# Patient Record
Sex: MALE | Race: WHITE | Employment: FULL TIME | URBAN - METROPOLITAN AREA
[De-identification: names, ages, dates, MRNs, and addresses within clinical notes are randomized per-mention and may not be internally consistent; named-entity substitution may affect disease eponyms.]

---

## 2024-11-14 ENCOUNTER — HOSPITAL ENCOUNTER (INPATIENT)
Age: 34
LOS: 1 days | Discharge: HOME | End: 2024-11-15
Attending: FAMILY MEDICINE | Admitting: INTERNAL MEDICINE
Payer: COMMERCIAL

## 2024-11-14 ENCOUNTER — APPOINTMENT (OUTPATIENT)
Dept: CT IMAGING | Age: 34
End: 2024-11-14
Payer: COMMERCIAL

## 2024-11-14 ENCOUNTER — HOSPITAL ENCOUNTER (EMERGENCY)
Age: 34
Discharge: ANOTHER HOSPITAL | End: 2024-11-14
Attending: EMERGENCY MEDICINE
Payer: COMMERCIAL

## 2024-11-14 VITALS
HEART RATE: 76 BPM | WEIGHT: 170 LBS | RESPIRATION RATE: 11 BRPM | OXYGEN SATURATION: 98 % | BODY MASS INDEX: 20.7 KG/M2 | DIASTOLIC BLOOD PRESSURE: 94 MMHG | HEIGHT: 76 IN | SYSTOLIC BLOOD PRESSURE: 130 MMHG

## 2024-11-14 DIAGNOSIS — I77.74 VERTEBRAL ARTERY DISSECTION (HCC): Primary | ICD-10-CM

## 2024-11-14 DIAGNOSIS — R20.2 FACIAL PARESTHESIA: ICD-10-CM

## 2024-11-14 DIAGNOSIS — R29.90 STROKE-LIKE SYMPTOMS: ICD-10-CM

## 2024-11-14 LAB
ALBUMIN SERPL-MCNC: 5 G/DL (ref 3.4–5)
ALBUMIN/GLOB SERPL: 1.8 {RATIO} (ref 1.1–2.2)
ALP SERPL-CCNC: 92 U/L (ref 40–129)
ALT SERPL-CCNC: 20 U/L (ref 10–40)
ANION GAP SERPL CALCULATED.3IONS-SCNC: 9 MMOL/L (ref 3–16)
AST SERPL-CCNC: 21 U/L (ref 15–37)
BASOPHILS # BLD: 0.1 K/UL (ref 0–0.2)
BASOPHILS NFR BLD: 0.8 %
BILIRUB SERPL-MCNC: 2.1 MG/DL (ref 0–1)
BUN SERPL-MCNC: 20 MG/DL (ref 7–20)
CALCIUM SERPL-MCNC: 10.4 MG/DL (ref 8.3–10.6)
CHLORIDE SERPL-SCNC: 99 MMOL/L (ref 99–110)
CO2 SERPL-SCNC: 31 MMOL/L (ref 21–32)
CREAT SERPL-MCNC: 1 MG/DL (ref 0.9–1.3)
DEPRECATED RDW RBC AUTO: 13.1 % (ref 12.4–15.4)
EKG ATRIAL RATE: 73 BPM
EKG DIAGNOSIS: NORMAL
EKG P AXIS: 68 DEGREES
EKG P-R INTERVAL: 152 MS
EKG Q-T INTERVAL: 372 MS
EKG QRS DURATION: 90 MS
EKG QTC CALCULATION (BAZETT): 409 MS
EKG R AXIS: -41 DEGREES
EKG T AXIS: 69 DEGREES
EKG VENTRICULAR RATE: 73 BPM
EOSINOPHIL # BLD: 0 K/UL (ref 0–0.6)
EOSINOPHIL NFR BLD: 0.3 %
GFR SERPLBLD CREATININE-BSD FMLA CKD-EPI: >90 ML/MIN/{1.73_M2}
GLUCOSE BLD-MCNC: 113 MG/DL (ref 70–99)
GLUCOSE SERPL-MCNC: 108 MG/DL (ref 70–99)
HCT VFR BLD AUTO: 48.1 % (ref 40.5–52.5)
HGB BLD-MCNC: 16.6 G/DL (ref 13.5–17.5)
INR PPP: 1.11 (ref 0.85–1.15)
LYMPHOCYTES # BLD: 1.5 K/UL (ref 1–5.1)
LYMPHOCYTES NFR BLD: 24 %
MAGNESIUM SERPL-MCNC: 2.2 MG/DL (ref 1.8–2.4)
MCH RBC QN AUTO: 30.2 PG (ref 26–34)
MCHC RBC AUTO-ENTMCNC: 34.5 G/DL (ref 31–36)
MCV RBC AUTO: 87.4 FL (ref 80–100)
MONOCYTES # BLD: 0.7 K/UL (ref 0–1.3)
MONOCYTES NFR BLD: 11 %
NEUTROPHILS # BLD: 4.1 K/UL (ref 1.7–7.7)
NEUTROPHILS NFR BLD: 63.9 %
PERFORMED ON: ABNORMAL
PLATELET # BLD AUTO: 235 K/UL (ref 135–450)
PMV BLD AUTO: 7.5 FL (ref 5–10.5)
POTASSIUM SERPL-SCNC: 4.3 MMOL/L (ref 3.5–5.1)
PROT SERPL-MCNC: 7.8 G/DL (ref 6.4–8.2)
PROTHROMBIN TIME: 14.5 SEC (ref 11.9–14.9)
RBC # BLD AUTO: 5.51 M/UL (ref 4.2–5.9)
SODIUM SERPL-SCNC: 139 MMOL/L (ref 136–145)
TROPONIN, HIGH SENSITIVITY: 8 NG/L (ref 0–22)
WBC # BLD AUTO: 6.4 K/UL (ref 4–11)

## 2024-11-14 PROCEDURE — 96374 THER/PROPH/DIAG INJ IV PUSH: CPT

## 2024-11-14 PROCEDURE — 99285 EMERGENCY DEPT VISIT HI MDM: CPT

## 2024-11-14 PROCEDURE — 6370000000 HC RX 637 (ALT 250 FOR IP)

## 2024-11-14 PROCEDURE — 36415 COLL VENOUS BLD VENIPUNCTURE: CPT

## 2024-11-14 PROCEDURE — 6370000000 HC RX 637 (ALT 250 FOR IP): Performed by: EMERGENCY MEDICINE

## 2024-11-14 PROCEDURE — 70450 CT HEAD/BRAIN W/O DYE: CPT

## 2024-11-14 PROCEDURE — 70498 CT ANGIOGRAPHY NECK: CPT

## 2024-11-14 PROCEDURE — 85025 COMPLETE CBC W/AUTO DIFF WBC: CPT

## 2024-11-14 PROCEDURE — 83735 ASSAY OF MAGNESIUM: CPT

## 2024-11-14 PROCEDURE — 84484 ASSAY OF TROPONIN QUANT: CPT

## 2024-11-14 PROCEDURE — 83036 HEMOGLOBIN GLYCOSYLATED A1C: CPT

## 2024-11-14 PROCEDURE — 85610 PROTHROMBIN TIME: CPT

## 2024-11-14 PROCEDURE — 6360000002 HC RX W HCPCS: Performed by: EMERGENCY MEDICINE

## 2024-11-14 PROCEDURE — 6360000004 HC RX CONTRAST MEDICATION: Performed by: EMERGENCY MEDICINE

## 2024-11-14 PROCEDURE — 80053 COMPREHEN METABOLIC PANEL: CPT

## 2024-11-14 PROCEDURE — 80061 LIPID PANEL: CPT

## 2024-11-14 PROCEDURE — 2060000000 HC ICU INTERMEDIATE R&B

## 2024-11-14 RX ORDER — ATORVASTATIN CALCIUM 80 MG/1
80 TABLET, FILM COATED ORAL NIGHTLY
Status: DISCONTINUED | OUTPATIENT
Start: 2024-11-14 | End: 2024-11-15 | Stop reason: HOSPADM

## 2024-11-14 RX ORDER — POTASSIUM CHLORIDE 7.45 MG/ML
10 INJECTION INTRAVENOUS PRN
Status: CANCELLED | OUTPATIENT
Start: 2024-11-14

## 2024-11-14 RX ORDER — SODIUM CHLORIDE 0.9 % (FLUSH) 0.9 %
5-40 SYRINGE (ML) INJECTION EVERY 12 HOURS SCHEDULED
Status: DISCONTINUED | OUTPATIENT
Start: 2024-11-14 | End: 2024-11-15 | Stop reason: HOSPADM

## 2024-11-14 RX ORDER — SODIUM CHLORIDE 9 MG/ML
INJECTION, SOLUTION INTRAVENOUS PRN
Status: DISCONTINUED | OUTPATIENT
Start: 2024-11-14 | End: 2024-11-15 | Stop reason: HOSPADM

## 2024-11-14 RX ORDER — POTASSIUM CHLORIDE 1500 MG/1
40 TABLET, EXTENDED RELEASE ORAL PRN
Status: CANCELLED | OUTPATIENT
Start: 2024-11-14

## 2024-11-14 RX ORDER — ACETAMINOPHEN 325 MG/1
650 TABLET ORAL EVERY 6 HOURS PRN
Status: DISCONTINUED | OUTPATIENT
Start: 2024-11-14 | End: 2024-11-15 | Stop reason: HOSPADM

## 2024-11-14 RX ORDER — ACETAMINOPHEN 650 MG/1
650 SUPPOSITORY RECTAL EVERY 6 HOURS PRN
Status: DISCONTINUED | OUTPATIENT
Start: 2024-11-14 | End: 2024-11-15 | Stop reason: HOSPADM

## 2024-11-14 RX ORDER — IOPAMIDOL 755 MG/ML
75 INJECTION, SOLUTION INTRAVASCULAR
Status: COMPLETED | OUTPATIENT
Start: 2024-11-14 | End: 2024-11-14

## 2024-11-14 RX ORDER — HYDRALAZINE HYDROCHLORIDE 20 MG/ML
5 INJECTION INTRAMUSCULAR; INTRAVENOUS ONCE
Status: COMPLETED | OUTPATIENT
Start: 2024-11-14 | End: 2024-11-14

## 2024-11-14 RX ORDER — ONDANSETRON 4 MG/1
4 TABLET, ORALLY DISINTEGRATING ORAL EVERY 8 HOURS PRN
Status: CANCELLED | OUTPATIENT
Start: 2024-11-14

## 2024-11-14 RX ORDER — MAGNESIUM SULFATE IN WATER 40 MG/ML
2000 INJECTION, SOLUTION INTRAVENOUS PRN
Status: CANCELLED | OUTPATIENT
Start: 2024-11-14

## 2024-11-14 RX ORDER — SODIUM CHLORIDE 0.9 % (FLUSH) 0.9 %
5-40 SYRINGE (ML) INJECTION PRN
Status: DISCONTINUED | OUTPATIENT
Start: 2024-11-14 | End: 2024-11-15 | Stop reason: HOSPADM

## 2024-11-14 RX ORDER — ENOXAPARIN SODIUM 100 MG/ML
40 INJECTION SUBCUTANEOUS DAILY
Status: CANCELLED | OUTPATIENT
Start: 2024-11-15

## 2024-11-14 RX ORDER — ASPIRIN 81 MG/1
324 TABLET, CHEWABLE ORAL ONCE
Status: COMPLETED | OUTPATIENT
Start: 2024-11-14 | End: 2024-11-14

## 2024-11-14 RX ORDER — ASPIRIN 81 MG/1
81 TABLET, CHEWABLE ORAL DAILY
Status: DISCONTINUED | OUTPATIENT
Start: 2024-11-15 | End: 2024-11-15 | Stop reason: HOSPADM

## 2024-11-14 RX ORDER — POLYETHYLENE GLYCOL 3350 17 G/17G
17 POWDER, FOR SOLUTION ORAL DAILY PRN
Status: DISCONTINUED | OUTPATIENT
Start: 2024-11-14 | End: 2024-11-15 | Stop reason: HOSPADM

## 2024-11-14 RX ORDER — ONDANSETRON 2 MG/ML
4 INJECTION INTRAMUSCULAR; INTRAVENOUS EVERY 6 HOURS PRN
Status: CANCELLED | OUTPATIENT
Start: 2024-11-14

## 2024-11-14 RX ADMIN — ASPIRIN 324 MG: 81 TABLET, CHEWABLE ORAL at 16:31

## 2024-11-14 RX ADMIN — HYDRALAZINE HYDROCHLORIDE 5 MG: 20 INJECTION INTRAMUSCULAR; INTRAVENOUS at 16:31

## 2024-11-14 RX ADMIN — ATORVASTATIN CALCIUM 80 MG: 80 TABLET, FILM COATED ORAL at 23:37

## 2024-11-14 RX ADMIN — IOPAMIDOL 75 ML: 755 INJECTION, SOLUTION INTRAVENOUS at 14:32

## 2024-11-14 ASSESSMENT — PAIN SCALES - GENERAL: PAINLEVEL_OUTOF10: 2

## 2024-11-14 ASSESSMENT — PAIN - FUNCTIONAL ASSESSMENT: PAIN_FUNCTIONAL_ASSESSMENT: 0-10

## 2024-11-14 NOTE — ED PROVIDER NOTES
EMERGENCY DEPARTMENT PROVIDER NOTE    Patient Identification  Pt Name: Dameon Kessler  MRN: [id number]  Birthdate 1990  Date of evaluation: 11/14/2024  Provider: Giovanny Aguilar DO  PCP: No primary care provider on file.    Chief Complaint  Numbness (Left face numbness began at 10:45 am, tunnel vision and he reports he could not say what he was thinking/processing information)      HPI  (History provided by patient and spouse)  This is a 34 y.o. male who was brought in by self for left-sided facial numbness.  Patient states he first began feeling \"off\" around 0830 this morning, while at work around 1045 today he developed left-sided facial tingling and felt as though he was losing peripheral vision in both eyes.  His colleagues at work noticed he was having difficulty with his speech as well, patient felt he could not form words correctly.  He has had a mild headache today, however has had headaches off and on for months which have been similar.  Also reports mild neck pain ongoing for the past week.  He has not had any trauma or injury.  He denies any weakness or numbness of the extremities.  No difficulties with ambulation.     Patient states he was told he has hypertension in the past, however does not currently take any medications for it.  Denies any history of high cholesterol, diabetes or tobacco use.  No alcohol or recreational drug use.      I have reviewed the following nursing documentation:  Allergies: Patient has no known allergies.    Past medical history: No past medical history on file.  Past surgical history: No past surgical history on file.    Home medications:   Previous Medications    No medications on file       Social history:      Family history:  No family history on file.      Exam  ED Triage Vitals [11/14/24 1416]   BP Systolic BP Percentile Diastolic BP Percentile Temp Temp src Pulse Respirations SpO2   (!) 168/109 -- -- -- -- 77 16 100 %      Height Weight - Scale         1.93 m (6'

## 2024-11-14 NOTE — ED NOTES
Pt requesting wife to transport him to [facility]. Advised Dr. ALICIA Aguilar, Per Dr. Aguilar. It is recommended pt to be transported via private ambulance. But pt is allowed to refuse after education of risk.     Spoke with patient and wife. Pt understands the risks of driving via private vehicle. Pt wishes to be transported by wife.    PIV wrapped with coban. Advised pt to drive directly to [facility] and remain NPO other than ice chips/water.     Pt provided with patient chart and EMTALA.

## 2024-11-14 NOTE — PLAN OF CARE
GITWAN Attending acceptance note    Per report of ED Physician; Not a management plan which would be formulated when patient is independently evaluated on arrival at the [facility]      34 y.o. male hx of HTN, not on meds, who presents to the ED after having an episode today of mild dysarthria, bilateral peripheral vision loss, and facial tingling.     Appears deficits almost completely resolved while in the ED, hence not felt to be a candidate for TNK     HIs CT head was neg.     CTA was noted to have concern for R vertebral artery dissection w/out thrombus.       Was given aspirin (325 mg) per  stroke team and Neurosurgery. No plavix no heparin, No apparent LVO, so does not require endovascular therapy. No need for ICU per NSGY and  stroke Team. Transferring provider agrees.    To update us with any neuro changes    /109 on presentation      Empiric diagnosis    TIA possible acute CVA    R vertebral artery dissection    Hypertension, uncontrolled        Accepted to PCU level of care based on recommendation.   stroke team and neurosurgery service to home the ED doctor has discussed patient with.    Neuro-checks    Monitor BP    We will have a low threshold to transfer to ICU    Consult neurology on arrival please.    Management plan to be formulated after pxt is seen. Please see H and P to follow  Once patient arrive please page ON CALL HOSPITALIST so patient can be seen.   If unable to reach physician on [facility] please call hospitalist phone (#[phone])       Sae Lehman MD

## 2024-11-14 NOTE — CONSULTS
Stroke Team Phone Consultation:     I was contacted regarding Mr. Kessler. Briefly, Dameon Kessler is a 34 y.o. male who presents to the ED after having an episode today of mild dysarthria, bilateral peripheral vision loss, and facial tingling. Per discussion w/ Dr. Aguilar, the deficits have near completely resolved, and are currently non disabling. Thus, he is not a candidate for TNK. HIs CT head was neg. However, CTA was noted to have concern for R vertebral artery dissection w/out thrombus. I have recommended aspirin (325 mg), and admission for further w/up and monitoring. At the moment, the patient does not have an LVO, so does not require endovascular therapy. Please contact  Stroke Team with additional questions / concerns.     Marilyn Soria MD MS   Stroke Team

## 2024-11-15 ENCOUNTER — HOSPITAL ENCOUNTER (INPATIENT)
Age: 34
Discharge: HOME | End: 2024-11-17
Attending: FAMILY MEDICINE
Payer: COMMERCIAL

## 2024-11-15 ENCOUNTER — APPOINTMENT (OUTPATIENT)
Dept: MRI IMAGING | Age: 34
End: 2024-11-15
Attending: FAMILY MEDICINE
Payer: COMMERCIAL

## 2024-11-15 VITALS
WEIGHT: 171 LBS | DIASTOLIC BLOOD PRESSURE: 93 MMHG | SYSTOLIC BLOOD PRESSURE: 142 MMHG | BODY MASS INDEX: 20.82 KG/M2 | HEIGHT: 76 IN

## 2024-11-15 VITALS
WEIGHT: 171.96 LBS | DIASTOLIC BLOOD PRESSURE: 93 MMHG | TEMPERATURE: 97.4 F | HEIGHT: 76 IN | SYSTOLIC BLOOD PRESSURE: 131 MMHG | BODY MASS INDEX: 20.94 KG/M2 | RESPIRATION RATE: 14 BRPM | HEART RATE: 76 BPM | OXYGEN SATURATION: 98 %

## 2024-11-15 LAB
ANION GAP SERPL CALCULATED.3IONS-SCNC: 10 MMOL/L (ref 3–16)
BASOPHILS # BLD: 0 K/UL (ref 0–0.2)
BASOPHILS NFR BLD: 0.7 %
BUN SERPL-MCNC: 16 MG/DL (ref 7–20)
CALCIUM SERPL-MCNC: 10 MG/DL (ref 8.3–10.6)
CHLORIDE SERPL-SCNC: 104 MMOL/L (ref 99–110)
CHOLEST SERPL-MCNC: 165 MG/DL (ref 0–199)
CO2 SERPL-SCNC: 26 MMOL/L (ref 21–32)
CREAT SERPL-MCNC: 1 MG/DL (ref 0.9–1.3)
DEPRECATED RDW RBC AUTO: 13 % (ref 12.4–15.4)
ECHO AO ROOT DIAM: 3.2 CM
ECHO AO ROOT INDEX: 1.55 CM/M2
ECHO AV AREA PEAK VELOCITY: 4.1 CM2
ECHO AV AREA/BSA PEAK VELOCITY: 2 CM2/M2
ECHO AV PEAK GRADIENT: 3 MMHG
ECHO AV PEAK VELOCITY: 0.8 M/S
ECHO AV VELOCITY RATIO: 1
ECHO BSA: 2.04 M2
ECHO IVC INSP: 0.2 CM
ECHO IVC PROX: 1.1 CM
ECHO LV E' LATERAL VELOCITY: 12.3 CM/S
ECHO LV E' SEPTAL VELOCITY: 9.55 CM/S
ECHO LV EDV A2C: 110 ML
ECHO LV EDV A4C: 118 ML
ECHO LV EDV INDEX A4C: 57 ML/M2
ECHO LV EDV NDEX A2C: 53 ML/M2
ECHO LV EJECTION FRACTION A2C: 58 %
ECHO LV EJECTION FRACTION A4C: 58 %
ECHO LV EJECTION FRACTION BIPLANE: 58 % (ref 55–100)
ECHO LV ESV A2C: 46 ML
ECHO LV ESV A4C: 49 ML
ECHO LV ESV INDEX A2C: 22 ML/M2
ECHO LV ESV INDEX A4C: 24 ML/M2
ECHO LV FRACTIONAL SHORTENING: 29 % (ref 28–44)
ECHO LV INTERNAL DIMENSION DIASTOLE INDEX: 1.98 CM/M2
ECHO LV INTERNAL DIMENSION DIASTOLIC: 4.1 CM (ref 4.2–5.9)
ECHO LV INTERNAL DIMENSION SYSTOLIC INDEX: 1.4 CM/M2
ECHO LV INTERNAL DIMENSION SYSTOLIC: 2.9 CM
ECHO LV IVSD: 0.9 CM (ref 0.6–1)
ECHO LV MASS 2D: 97.3 G (ref 88–224)
ECHO LV MASS INDEX 2D: 47 G/M2 (ref 49–115)
ECHO LV POSTERIOR WALL DIASTOLIC: 0.7 CM (ref 0.6–1)
ECHO LV RELATIVE WALL THICKNESS RATIO: 0.34
ECHO LVOT AREA: 4.5 CM2
ECHO LVOT DIAM: 2.4 CM
ECHO LVOT MEAN GRADIENT: 1 MMHG
ECHO LVOT PEAK GRADIENT: 2 MMHG
ECHO LVOT PEAK VELOCITY: 0.8 M/S
ECHO LVOT STROKE VOLUME INDEX: 31 ML/M2
ECHO LVOT SV: 64.2 ML
ECHO LVOT VTI: 14.2 CM
ECHO MV A VELOCITY: 0.75 M/S
ECHO MV E DECELERATION TIME (DT): 201 MS
ECHO MV E VELOCITY: 0.84 M/S
ECHO MV E/A RATIO: 1.12
ECHO MV E/E' LATERAL: 6.83
ECHO MV E/E' RATIO (AVERAGED): 7.81
ECHO MV E/E' SEPTAL: 8.8
ECHO PV MAX VELOCITY: 0.9 M/S
ECHO PV PEAK GRADIENT: 3 MMHG
ECHO RV BASAL DIMENSION: 3.9 CM
ECHO RV LONGITUDINAL DIMENSION: 7.5 CM
ECHO RV MID DIMENSION: 2 CM
ECHO RV TAPSE: 1.8 CM (ref 1.7–?)
EOSINOPHIL # BLD: 0 K/UL (ref 0–0.6)
EOSINOPHIL NFR BLD: 0.4 %
EST. AVERAGE GLUCOSE BLD GHB EST-MCNC: 96.8 MG/DL
GFR SERPLBLD CREATININE-BSD FMLA CKD-EPI: >90 ML/MIN/{1.73_M2}
GLUCOSE SERPL-MCNC: 82 MG/DL (ref 70–99)
HBA1C MFR BLD: 5 %
HCT VFR BLD AUTO: 47.6 % (ref 40.5–52.5)
HDLC SERPL-MCNC: 57 MG/DL (ref 40–60)
HGB BLD-MCNC: 16.5 G/DL (ref 13.5–17.5)
LACTATE BLDV-SCNC: 0.8 MMOL/L (ref 0.4–2)
LDLC SERPL CALC-MCNC: 97 MG/DL
LYMPHOCYTES # BLD: 1.4 K/UL (ref 1–5.1)
LYMPHOCYTES NFR BLD: 24.2 %
MAGNESIUM SERPL-MCNC: 2.18 MG/DL (ref 1.8–2.4)
MCH RBC QN AUTO: 31 PG (ref 26–34)
MCHC RBC AUTO-ENTMCNC: 34.7 G/DL (ref 31–36)
MCV RBC AUTO: 89.3 FL (ref 80–100)
MONOCYTES # BLD: 0.7 K/UL (ref 0–1.3)
MONOCYTES NFR BLD: 12.3 %
NEUTROPHILS # BLD: 3.7 K/UL (ref 1.7–7.7)
NEUTROPHILS NFR BLD: 62.4 %
PLATELET # BLD AUTO: 238 K/UL (ref 135–450)
PMV BLD AUTO: 8 FL (ref 5–10.5)
POTASSIUM SERPL-SCNC: 4.8 MMOL/L (ref 3.5–5.1)
RBC # BLD AUTO: 5.33 M/UL (ref 4.2–5.9)
SODIUM SERPL-SCNC: 140 MMOL/L (ref 136–145)
TRIGL SERPL-MCNC: 56 MG/DL (ref 0–150)
VLDLC SERPL CALC-MCNC: 11 MG/DL
WBC # BLD AUTO: 5.9 K/UL (ref 4–11)

## 2024-11-15 PROCEDURE — 83735 ASSAY OF MAGNESIUM: CPT

## 2024-11-15 PROCEDURE — 36415 COLL VENOUS BLD VENIPUNCTURE: CPT

## 2024-11-15 PROCEDURE — 93306 TTE W/DOPPLER COMPLETE: CPT | Performed by: INTERNAL MEDICINE

## 2024-11-15 PROCEDURE — 2580000003 HC RX 258

## 2024-11-15 PROCEDURE — 83605 ASSAY OF LACTIC ACID: CPT

## 2024-11-15 PROCEDURE — 85025 COMPLETE CBC W/AUTO DIFF WBC: CPT

## 2024-11-15 PROCEDURE — 80048 BASIC METABOLIC PNL TOTAL CA: CPT

## 2024-11-15 PROCEDURE — 93306 TTE W/DOPPLER COMPLETE: CPT

## 2024-11-15 PROCEDURE — 6370000000 HC RX 637 (ALT 250 FOR IP)

## 2024-11-15 PROCEDURE — APPNB180 APP NON BILLABLE TIME > 60 MINS

## 2024-11-15 PROCEDURE — 6370000000 HC RX 637 (ALT 250 FOR IP): Performed by: PSYCHIATRY & NEUROLOGY

## 2024-11-15 PROCEDURE — 99223 1ST HOSP IP/OBS HIGH 75: CPT | Performed by: PSYCHIATRY & NEUROLOGY

## 2024-11-15 PROCEDURE — 70551 MRI BRAIN STEM W/O DYE: CPT

## 2024-11-15 RX ORDER — ATORVASTATIN CALCIUM 80 MG/1
80 TABLET, FILM COATED ORAL NIGHTLY
Qty: 30 TABLET | Refills: 3 | Status: SHIPPED | OUTPATIENT
Start: 2024-11-15

## 2024-11-15 RX ORDER — ASPIRIN 81 MG/1
81 TABLET, CHEWABLE ORAL DAILY
Qty: 30 TABLET | Refills: 3 | Status: SHIPPED | OUTPATIENT
Start: 2024-11-16

## 2024-11-15 RX ORDER — CLOPIDOGREL BISULFATE 75 MG/1
75 TABLET ORAL DAILY
Qty: 21 TABLET | Refills: 0 | Status: SHIPPED | OUTPATIENT
Start: 2024-11-16 | End: 2024-12-07

## 2024-11-15 RX ORDER — CLOPIDOGREL BISULFATE 75 MG/1
75 TABLET ORAL DAILY
Status: DISCONTINUED | OUTPATIENT
Start: 2024-11-15 | End: 2024-11-15 | Stop reason: HOSPADM

## 2024-11-15 RX ORDER — HYDRALAZINE HYDROCHLORIDE 20 MG/ML
10 INJECTION INTRAMUSCULAR; INTRAVENOUS EVERY 4 HOURS PRN
Status: DISCONTINUED | OUTPATIENT
Start: 2024-11-15 | End: 2024-11-15 | Stop reason: HOSPADM

## 2024-11-15 RX ADMIN — ASPIRIN 81 MG: 81 TABLET, CHEWABLE ORAL at 07:50

## 2024-11-15 RX ADMIN — CLOPIDOGREL BISULFATE 75 MG: 75 TABLET ORAL at 10:01

## 2024-11-15 RX ADMIN — SODIUM CHLORIDE, PRESERVATIVE FREE 10 ML: 5 INJECTION INTRAVENOUS at 07:51

## 2024-11-15 ASSESSMENT — ENCOUNTER SYMPTOMS
VOMITING: 0
COUGH: 0
PHOTOPHOBIA: 0
NAUSEA: 0
SHORTNESS OF BREATH: 0
ABDOMINAL PAIN: 0

## 2024-11-15 ASSESSMENT — PAIN SCALES - GENERAL
PAINLEVEL_OUTOF10: 0
PAINLEVEL_OUTOF10: 0

## 2024-11-15 NOTE — CONSULTS
elevated to help prevent aspiration  -Q2H Neurologic Exams & Vitals  -NIHSS per guidelines   -Telemetry   -PT/OT: eval and treat  -PMR consult if rehab recommended   -ST: eval and treat  -Nursing bedside swallow screen prior to any PO intake   -Blood Pressure Management: Keep SBP <180    Follow up / Discharge Recommendations:  -Stroke Education at Discharge  -Follow up w/ Neurology in 3 months       Management and plan discussed with:   JUAN RAMON Yap - [facility]   Neurology  11/15/2024 4:39 AM  [facility]: [facility] Neurology    History of Present Illness     Dameon Kessler is a 34 y.o. y/o male with no significant medical history, who initially presented to [facility] with cc of blurred peripheral vision, facial numbness and difficulty thinking/processing information.  Patient states he woke up in his normal state of health yesterday morning. Patient went to work, and around 9 AM he reports he developed sudden onset blurred vision in his periphery bilaterally.  He said his vision was like \"static.\"  He stated that he also felt like he was having difficulty finding his words and explaining simple tasks at his job.  He states that his coworkers tried explaining a project to him and he could not fully comprehend or understand it like he normally would and felt like he had \"brain fog.\"  He said the blurred vision lasted approximately 2 hours before resolving on its own.  The blurred vision \"was not constant.\"  He stated the word finding difficulty and brain fog lasted approximately 30 minutes before resolving on its own. No dysarthria, which he states he confirmed with his wife and his coworkers.  As he was going to the hospital, he did report where he thought he had some very mild left jaw and cheek numbness. He thinks this lasted only for a few minutes before completely resolving on its own and prior to his arrival to [facility]. He states he is not totally sure if what he experienced was true

## 2024-11-15 NOTE — CARE COORDINATION
Case Management Assessment            Discharge Note                    Date / Time of Note: 11/15/2024 10:24 AM                  Discharge Note Completed by: EVAN Wynne    Patient Name: Dameon Kessler   YOB: 1990  Diagnosis: Vertebral artery dissection (HCC) [I77.74]   Date / Time: 11/14/2024  6:53 PM    Current PCP: No primary care provider on file.  Clinic patient: No    Hospitalization in the last 30 days: No       Advance Directives:  Code Status: Full Code  [place] DNR form completed and on chart: Not Indicated    Financial:  Payor: BCBS / Plan: BCBS OUT OF STATE / Product Type: *No Product type* /      Pharmacy:    [id number] - [place], [place] - [address] - P [phone] - F [phone] [address]  [facility] [address]  Phone: [phone] Fax: [phone]      Assistance purchasing medications?:    Assistance provided by Case Management: None at this time    Does patient want to participate in local refill/ meds to beds program?: Yes    Meds To Beds General Rules:  1. Can ONLY be done Monday- Friday between 8:30am-5pm  2. Prescription(s) must be in pharmacy by 3pm to be filled same day  3.Copy of patient's insurance/ prescription drug card and patient face sheet must be sent along with the prescription(s)  4. Cost of Rx cannot be added to hospital bill. If financial assistance is needed, please contact unit  or ;  or  CANNOT provide pharmacy voucher for patients co-pays  5. Patients can then  the prescription on their way out of the hospital at discharge, or pharmacy can deliver to the bedside if staff is available. (payment due at time of pick-up or delivery - cash, check, or card accepted)     Able to afford home medications/ co-pay costs: Yes    ADLS:  Current PT AM-PAC Score:   /24  Current OT AM-PAC Score:   /24    DISCHARGE Disposition: Home- No Services Needed    LOC at

## 2024-11-15 NOTE — DISCHARGE INSTRUCTIONS
Dear Mr Checo,     You were admitted for neurological symptoms that warranted a workup. Your CTA head neck showed a right vertebral artery dissection. Your MRI did not reveal any stroke.    - Please continue to take your aspirin 81mg daily  - Continue to take Plavix (Clopidogrel) for 21 days daily, but continue to take the aspirin   - Make an appointment with Dr. Perry the neurovascular surgeon in 6 weeks. Call [phone] to schedule.    - Please schedule your CT angiogram in 6 weeks prior to meeting with Dr. Perry.   - Follow up with your Primary care doctor in 1-2 weeks to follow up on blood pressure and discuss your hospitalization. You can periodically check your BP at home. Ideally, use a blood pressure cuff at home to check your blood pressure at times when you are seated and rested to ensure that it is improved.     If there are any new concerning symptoms at all, return to the ER immediately.

## 2024-11-15 NOTE — PROGRESS NOTES
Patient discharged. All discharge instructions reviewed with patient and his wife. PIV x1 removed w/o complication and dressing applied. Patient dressed in his own clothing and all personal belongings gathered. Patient to outpatient pharmacy to  Meds to Beds and then to wife's vehicle for D/C.

## 2024-11-15 NOTE — PLAN OF CARE
Problem: Discharge Planning  Goal: Discharge to home or other facility with appropriate resources  11/15/2024 0915 by Beti Jefferson, RN  Outcome: Progressing   Patient from home, IPTA. No discharge needs anticipated.    Problem: Neurosensory - Adult  Goal: Achieves stable or improved neurological status  11/15/2024 0915 by Beti Jefferson, RN  Outcome: Progressing   Patient alert and oriented x4. NIH 0. Neuro status WNL.

## 2024-11-15 NOTE — PROGRESS NOTES
Pt a/o x4. NIH 0. Denies pain. VSS on RA. Voiding adequately via BRP. Ambulating as a SBA to the bathroom. Fall precautions are in place.

## 2024-11-15 NOTE — PROGRESS NOTES
Patient alert and oriented x4, VSS on room air, NIH remains 0, neuro status WNL. Patient up as tolerated with steady gait. Denies pain, nausea. Tolerating PO fluids. MRI complete. Anticipating D/C this afternoon.

## 2024-11-15 NOTE — PLAN OF CARE
Problem: Discharge Planning  Goal: Discharge to home or other facility with appropriate resources  Outcome: Progressing     Problem: Neurosensory - Adult  Goal: Achieves stable or improved neurological status  Outcome: Progressing  Goal: Achieves maximal functionality and self care  Outcome: Progressing

## 2024-11-15 NOTE — PROGRESS NOTES
Internal Medicine Progress Note    Patient Name: Dameon Kessler   Patient : 1990   Date: 11/15/2024   Admit Date: 2024     CC: blurry vision       Interval History   - AF. -161. Otherwise VSS on RA.  - No complaints this AM. Wondering when he is able to go home.    HPI  34 y.o. y/o male with no significant medical history, who presents to [facility] as a transfer from [facility] with c/o of blurry bilateral peripheral vision, left facial numbness and brain fog, all of which now have resolved, and was found to have a right VA dissection on CTA. Head CT was unremarkable.   ROS   Review of Systems   Constitutional:  Negative for chills and fever.   Eyes:  Negative for photophobia and visual disturbance.   Respiratory:  Negative for cough and shortness of breath.    Cardiovascular:  Negative for chest pain and leg swelling.   Gastrointestinal:  Negative for abdominal pain, nausea and vomiting.   Musculoskeletal:  Negative for neck pain.   Neurological:  Negative for dizziness, weakness, light-headedness, numbness and headaches.   Psychiatric/Behavioral:  Negative for confusion.           Objective     I/Os:  I/O last 3 completed shifts:  In: 120 [P.O.:120]  Out: -       Vital Signs:  Patient Vitals for the past 8 hrs:   BP Temp Temp src Pulse Resp SpO2   11/15/24 0746 (!) 142/93 97.7 °F (36.5 °C) Oral 67 16 99 %   11/15/24 0400 136/88 97.6 °F (36.4 °C) Temporal 59 16 96 %       Physical Exam:  Physical Exam  Vitals and nursing note reviewed.   Constitutional:       General: He is not in acute distress.  HENT:      Head: Normocephalic and atraumatic.      Mouth/Throat:      Mouth: Mucous membranes are dry.   Eyes:      General: No scleral icterus.     Extraocular Movements: Extraocular movements intact.      Conjunctiva/sclera: Conjunctivae normal.      Pupils: Pupils are equal, round, and reactive to light.   Cardiovascular:      Rate and Rhythm: Normal rate and regular rhythm.      Pulses: Normal  pulses.      Heart sounds: No murmur heard.  Pulmonary:      Effort: Pulmonary effort is normal. No respiratory distress.   Abdominal:      General: There is no distension.      Palpations: Abdomen is soft.      Tenderness: There is no abdominal tenderness.   Musculoskeletal:      Cervical back: Normal range of motion.      Right lower leg: No edema.      Left lower leg: No edema.   Skin:     General: Skin is warm and dry.   Neurological:      General: No focal deficit present.      Mental Status: He is alert and oriented to person, place, and time.      Cranial Nerves: Cranial nerves 2-12 are intact. No cranial nerve deficit or dysarthria.      Motor: No weakness.   Psychiatric:         Mood and Affect: Mood normal.       Medications:   clopidogrel  75 mg Oral Daily    sodium chloride flush  5-40 mL IntraVENous 2 times per day    aspirin  81 mg Oral Daily    atorvastatin  80 mg Oral Nightly       sodium chloride        hydrALAZINE, sodium chloride flush, sodium chloride, polyethylene glycol, acetaminophen **OR** acetaminophen     Labs:  CBC:   Recent Labs     11/14/24  1420 11/15/24  0533   WBC 6.4 5.9   HGB 16.6 16.5   HCT 48.1 47.6    238   MCV 87.4 89.3       Renal:    Recent Labs     11/14/24  1420 11/14/24  2259 11/15/24  0533     --  140   K 4.3  --  4.8   CL 99  --  104   CO2 31  --  26   BUN 20  --  16   CREATININE 1.0  --  1.0   GLUCOSE 108*  --  82   CALCIUM 10.4  --  10.0   MG  --  2.20  --    ANIONGAP 9  --  10     Hepatic:   Recent Labs     11/14/24  1420   AST 21   ALT 20   BILITOT 2.1*   ALKPHOS 92       Troponin:   Recent Labs     11/14/24  1420   TROPHS 8       Lipids:   Recent Labs     11/14/24  2259   CHOL 165   TRIG 56   HDL 57   VLDL 11       INR:   Recent Labs     11/14/24  1523   INR 1.11       Microbiology:  Results       ** No results found for the last 336 hours. **             Radiology:  MRI BRAIN WO CONTRAST   Final Result      No acute intracranial abnormality. No evidence

## 2024-11-15 NOTE — DISCHARGE SUMMARY
INTERNAL MEDICINE DEPARTMENT  DISCHARGE SUMMARY    Patient ID: Dameon Kessler                                             Discharge Date: 11/15/2024   Patient's PCP: No primary care provider on file.                                          Discharge Physician: Amaya Frederick MD MD  Admit Date: 11/14/2024   Admitting Physician: Memo Menon MD    PROBLEMS DURING HOSPITALIZATION:  Present on Admission:   Vertebral artery dissection (HCC)      DISCHARGE DIAGNOSES:  R Vertebral Artery Dissection    Hospital Course:    34 y.o. y/o male with no significant medical history, who presents to [facility] as a transfer from [facility] with c/o of blurry bilateral peripheral vision, left facial numbness and brain fog and was found to have a right Vertebral artery dissection on CTA. Head CT was unremarkable.     The following issues were addressed during hospitalization:    #R Vertebral Artery Dissection  Patient presented with symptoms of bilateral blurring of vision in periphery, some cognitive difficulty, and small patch of left lower face numbness.  Found to have CTA showing R Vertebral artery dissection. Neurology consulted and patient started on ASA 81mg and lipitor 80mg nightly. MRI brain showed no acute intracranial abnormality with no evidence of acute infarction. Echo performed and patient had normal left ventricular systolic function with EF of 55 - 60%. Patient mildly hypertensive on admission but appropriately controlled without any medication. Patient to start plavix 75mg daily for 21 days and follow up with neurovascular surgery in 6 weeks. Patient to also get repeat CTA in 6 weeks. Patient should follow up with neurology and PCP upon discharge.    On the basis of discharge, patient reported feeling stable.  The patient was found to not be in any acute distress, with vital signs within normal limits, and no abnormalities on physical examination.  Further, the patient expressed appropriate understanding of, and  agreement with, the discharge recommendations, medications and plan.    Physical Exam:  Vitals: BP (!) 131/93   Pulse 76   Temp 97.4 °F (36.3 °C) (Oral)   Resp 14   Ht 1.93 m (6' 4\")   Wt 78 kg (171 lb 15.3 oz)   SpO2 98%   BMI 20.93 kg/m²   I/O :   Intake/Output Summary (Last 24 hours) at 11/15/2024 1356  Last data filed at 11/15/2024 1056  Gross per 24 hour   Intake 360 ml   Output --   Net 360 ml         General appearance: alert, appears stated age and cooperative  HEENT: Head: Normocephalic, no lesions, without obvious abnormality.  Lungs: clear to auscultation bilaterally  Heart: regular rate and rhythm, S1, S2 normal, no murmur, click, rub or gallop  Abdomen: soft, non-tender; bowel sounds normal; no masses,  no organomegaly  Extremities: extremities normal, atraumatic, no cyanosis or edema  Neurologic: Mental status: Alert, oriented, thought content appropriate    Labs: For convenience and continuity at follow-up the following most recent labs are provided:      CBC:    Lab Results   Component Value Date/Time    WBC 5.9 11/15/2024 05:33 AM    HGB 16.5 11/15/2024 05:33 AM    HCT 47.6 11/15/2024 05:33 AM     11/15/2024 05:33 AM       Renal:    Lab Results   Component Value Date/Time     11/15/2024 05:33 AM    K 4.8 11/15/2024 05:33 AM     11/15/2024 05:33 AM    CO2 26 11/15/2024 05:33 AM    BUN 16 11/15/2024 05:33 AM    CREATININE 1.0 11/15/2024 05:33 AM    CALCIUM 10.0 11/15/2024 05:33 AM       Consults:   - Neurology    Significant Diagnostic Studies:    - CTA: R Vertebral artery dissection    Treatments:  - ASA/Plavix/statin    Disposition: home  Discharged Condition: Stable  Follow Up: Primary Care Physician in two weeks    DISCHARGE MEDICATION:     Medication List        START taking these medications      aspirin 81 MG chewable tablet  Take 1 tablet by mouth daily  Start taking on: November 16, 2024  Notes to patient: Aspirin  USE--Prevents heart attack and stroke (decreases  negative - no fever

## 2024-11-15 NOTE — H&P
Internal Medicine  PGY 1  History & Physical      CC vision changes, word finding difficulty    History Obtained From:  patient    HISTORY OF PRESENT ILLNESS:  34-year-old male without much known PMH who presented to [facility] on 11/14/2024 with complaint of 2 episodes of blurry peripheral vision and word finding difficulty starting in the morning on the day of presentation.      History was obtained from patient and his spouse at bedside.  Per patient he was otherwise at his baseline until last week when he had 1 episode of self resolved peripheral vision changes.  For the past week he has experienced mild right-sided neck discomfort. On the morning of presentation he had another transient episode of bilateral peripheral blurry vision that self resolved after 30 minutes.  While at work he noticed word finding difficulty and difficulty comprehending speech that lasted from around 9-11 AM.  Around 1045 patient developed left-sided facial tingling, as well as another episode of bilateral blurry peripheral vision.     At around 1415 patient presented to [facility] where he was hypertensive with BP of 168/109, but otherwise asymptomatic.  CT head was negative for acute ICH.  He was evaluated by  stroke team therefore was not considered a candidate for TNK. CTA was concerning for right vertebral artery dissection and patient was transferred to the [facility] for neurosurgical evaluation and treatment.    Patient denies any chest pain, palpitation, LOC, other weakness/numbness, or any other acute complaints.  Patient does not have a known PMH, nor does he have a PCP.  Of note patient does have a history of \"migraine\" but he has never experienced similar symptoms as those that brought him in this time.    Past Medical History:    No past medical history on file.    Past Surgical History:    No past surgical history on file.    Medications Priorto Admission:    No medications prior to  (improved)  Transient episode of global aphasia (improved)  L facial paresthesia/hypoesthesia  Possible history of HTN (measured during dentist visit, possibly whitecoat HTN, no prior treatment)  ?History of \"migraine with aura\" ?(dx not noted in chart)   History of chronic headaches w/o associated sx (once/wk), treated with OTC medication (excedrin)  Ddx TIA, resolved stroke, less likely migraine and/or hypertension contributing to presenting symptoms    Presented after transient episodes of bilateral peripheral blurry vision and 1 episode of word finding difficulty and difficulty comprehending speech.  No previous similar episodes  Patient reports he habitually cracks his neck, no recent trauma/neck manipulation eg chiropractic care  [place] 168/109 -> [facility] 161/87 -> 143/86,  -> 76  CTA suggestive of R vertebral artery dissection    -SBP goal less than 160  -Neurosurgery C/S, will appreciate recs  - N.p.o. from midnight  -Every 2 hours neurochecks  - Continuous telemetry  -MRI brain with without contrast    Will discuss with attending physician Dr. Lynsey MD    Code Status:Full code  FEN: N.p.o. from midnight  PPX: SCD  DISPO: Home to [facility], transferred to [facility], neurosurgery evaluation and treatment of right vertebral artery dissection, post discharge TBD    Behnam Enayataval, MD  11/14/2024,  11:22 PM    I saw and evaluated the patient, performing the key elements of the service.  I discussed the findings, assessment and plan with the resident and agree with the resident's findings and plan as documented in the resident's note.

## 2024-11-15 NOTE — PROGRESS NOTES
Patient admitted to room 5523. Report received from ACE Wilkerson at bedside. VSS on room air. Patient oriented to room and call light. Rights and responsibilities provided to patient, and welcome packet provided to patient. 4 eyes skin assessment completed with 2nd RN.